# Patient Record
(demographics unavailable — no encounter records)

---

## 2024-12-19 NOTE — PHYSICAL EXAM
[Right] : right shoulder [] : no deformity [FreeTextEntry1] : xray of the R shoulder revealed no evidence of hill sachs lesion  [TWNoteComboBox4] : passive forward flexion 175 degrees

## 2024-12-19 NOTE — DATA REVIEWED
[MRI] : MRI [Right] : of the right [Shoulder] : shoulder [Report was reviewed and noted in the chart] : The report was reviewed and noted in the chart [I independently reviewed and interpreted images and report] : I independently reviewed and interpreted images and report [I reviewed the films/CD] : I reviewed the films/CD [FreeTextEntry1] : 10/02/24:MRI of the R shoulder revealed a  nondisplaced labral tear of the posterior superior labrum with associated para labral cyst

## 2024-12-19 NOTE — DISCUSSION/SUMMARY
[de-identified] : The patient's condition is acute Confounding medical conditions/concerns: N/A Tests/Studies Independently Interpreted Today: MRI of the R shoulder revealed a  nondisplaced labral tear of the posterior superior labrum with associated para labral cyst  xray of the c-spine revealed  no evidence of dyana abnormalities xray of the R shoulder revealed no evidence of hill sachs lesion ------------------------------------------------------------------------------------------------------------------   We reviewed the MRI findings and discussed treatment options, both operative vs non-operative for the patient's shoulder instability and labral tear. Discussed treatment options for considering he has noted multiple dislocation episodes. 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 Advised the patient that there is increased risk of requiring surgery if their shoulder continues to dislocate. Any recurrent dislocation will result in further, possible permanent cartilage damage eventually requiring surgical intervention.   Discussed the risk of progression of OA and mechanical issues with an unfixed labral tear  Both the pt and mother understand and wish to proceed with conservative management during his wrestling season  Also discussed Sherie brace as well   Prescribed the patient Ibuprofen 600mgs, take one tablet three times daily with food. Discussed risks of side effects and timing and management of medication. Side effects include but are not limited to gi ulcers and irritation, as well as kidney failure and bleeding issues.   Discussed activity modifications. Rec load management during his wrestling season  Can follow up during his off season to re-eval and consider sx management    I, Manju Rico, attest that this documentation has been prepared under the direction and in the presence of Provider Dr. Pedro Luis Monteiro

## 2024-12-19 NOTE — DISCUSSION/SUMMARY
[de-identified] : The patient's condition is acute Confounding medical conditions/concerns: N/A Tests/Studies Independently Interpreted Today: MRI of the R shoulder revealed a  nondisplaced labral tear of the posterior superior labrum with associated para labral cyst  xray of the c-spine revealed  no evidence of dyana abnormalities xray of the R shoulder revealed no evidence of hill sachs lesion ------------------------------------------------------------------------------------------------------------------   We reviewed the MRI findings and discussed treatment options, both operative vs non-operative for the patient's shoulder instability and labral tear. Discussed treatment options for considering he has noted multiple dislocation episodes. 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 Advised the patient that there is increased risk of requiring surgery if their shoulder continues to dislocate. Any recurrent dislocation will result in further, possible permanent cartilage damage eventually requiring surgical intervention.   Discussed the risk of progression of OA and mechanical issues with an unfixed labral tear  Both the pt and mother understand and wish to proceed with conservative management during his wrestling season  Also discussed Sherie brace as well   Prescribed the patient Ibuprofen 600mgs, take one tablet three times daily with food. Discussed risks of side effects and timing and management of medication. Side effects include but are not limited to gi ulcers and irritation, as well as kidney failure and bleeding issues.   Discussed activity modifications. Rec load management during his wrestling season  Can follow up during his off season to re-eval and consider sx management    I, Manju Rico, attest that this documentation has been prepared under the direction and in the presence of Provider Dr. Pedro Luis Monteiro

## 2024-12-19 NOTE — PHYSICAL EXAM
no [Right] : right shoulder [] : no deformity [FreeTextEntry1] : xray of the R shoulder revealed no evidence of hill sachs lesion  [TWNoteComboBox4] : passive forward flexion 175 degrees

## 2024-12-19 NOTE — HISTORY OF PRESENT ILLNESS
[de-identified] : Here for consult on the right shoulder that has been having continued complaints with subluxations/disclocations since last year. Had been treating with another MD, obtained a MRI @ [facility] and has been doing PT since last year but has not gotten much relief. Wrestles for [facility]. Referred from  @ [facility] this past weekend

## 2024-12-19 NOTE — HISTORY OF PRESENT ILLNESS
[de-identified] : Here for consult on the right shoulder that has been having continued complaints with subluxations/disclocations since last year. Had been treating with another MD, obtained a MRI @ [facility] and has been doing PT since last year but has not gotten much relief. 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 Referred from  @ [facility] this past weekend

## 2025-01-30 NOTE — HISTORY OF PRESENT ILLNESS
[de-identified] : Follow up on the MRI Results of the right shoulder. Has been wrestling and just some soreness following. Mom says that they were called with the preliminary results.

## 2025-01-30 NOTE — DISCUSSION/SUMMARY
[Medication Risks Reviewed] : Medication risks reviewed [Surgical risks reviewed] : Surgical risks reviewed [de-identified] : The patients condition is acute.  Confounding medical conditions/concerns: None  Tests/Studies Independently Interpreted Today: MRI right shoulder reveals evidence of chronic appearing anterior inferior labral tearing, acromioclavicular joint effusion and sprain.   We reviewed the MRI findings and discussed treatment options, both operative vs non-operative for the patient's shoulder instability and labral tearing given his wrestling activity. Since the patient denies any acute shoulder dislocation, he decides to hold off on surgical intervention and proceed with conservative treatment modalities to include physical therapy, NSAIDs, ice, bracing, etc. Advised the patient that there is increased risk of requiring surgery if his shoulder does dislocate. Any dislocation will result in further, possible permanent cartilage damage eventually requiring surgical intervention. The risks and benefits of surgery have been discussed. Risks include but are not limited to bleeding, infection, reaction to anesthesia, injury to blood vessels and nerves, malunion, nonunion, DVT, PE, necessity of repeat surgery, chronic pain, loss of limb and death. The patient understands the risks and has chosen to proceed with conservative care. All questions have been answered. Start physical therapy   Upon completion of his wrestling season and his pain/function level, he will consider arthroscopy. If this is hindering his activity status, we recommended he proceed with arthroscopy labral repair. We are not ruling out surgery however, will defer.   Given the patients intact strength, he may continue to wrestle as tolerated. He may utilize a dary brace for practicing.   Prescribed the patient Motrin 600mgs and discussed risks of side effects and timing and management of medication. Side effects include but are not limited to gi ulcers and irritation, as well as kidney failure and bleeding issues.  Follow up in 4 weeks

## 2025-01-30 NOTE — DATA REVIEWED
[MRI] : MRI [Right] : of the right [Shoulder] : shoulder [I independently reviewed and interpreted images and report] : I independently reviewed and interpreted images and report [Report was reviewed and noted in the chart] : The report was reviewed and noted in the chart [I reviewed the films/CD] : I reviewed the films/CD [FreeTextEntry1] : MRI right shoulder reveals evidence of chronic appearing anterior inferior labral tearing, acromioclavicular joint effusion and sprain.

## 2025-01-30 NOTE — HISTORY OF PRESENT ILLNESS
[de-identified] : Follow up on the MRI Results of the right shoulder. Has been wrestling and just some soreness following. Mom says that they were called with the preliminary results.

## 2025-01-30 NOTE — DISCUSSION/SUMMARY
[Medication Risks Reviewed] : Medication risks reviewed [Surgical risks reviewed] : Surgical risks reviewed [de-identified] : The patients condition is acute.  Confounding medical conditions/concerns: None  Tests/Studies Independently Interpreted Today: MRI right shoulder reveals evidence of chronic appearing anterior inferior labral tearing, acromioclavicular joint effusion and sprain.   We reviewed the MRI findings and discussed treatment options, both operative vs non-operative for the patient's shoulder instability and labral tearing given his wrestling activity. Since the patient denies any acute shoulder dislocation, he decides to hold off on surgical intervention and proceed with conservative treatment modalities to include physical therapy, NSAIDs, ice, bracing, etc. Advised the patient that there is increased risk of requiring surgery if his shoulder does dislocate. Any dislocation will result in further, possible permanent cartilage damage eventually requiring surgical intervention. The risks and benefits of surgery have been discussed. Risks include but are not limited to bleeding, infection, reaction to anesthesia, injury to blood vessels and nerves, malunion, nonunion, DVT, PE, necessity of repeat surgery, chronic pain, loss of limb and death. The patient understands the risks and has chosen to proceed with conservative care. All questions have been answered. Start physical therapy   Upon completion of his wrestling season and his pain/function level, he will consider arthroscopy. If this is hindering his activity status, we recommended he proceed with arthroscopy labral repair. We are not ruling out surgery however, will defer.   Given the patients intact strength, he may continue to wrestle as tolerated. He may utilize a dary brace for practicing.   Prescribed the patient Motrin 600mgs and discussed risks of side effects and timing and management of medication. Side effects include but are not limited to gi ulcers and irritation, as well as kidney failure and bleeding issues.  Follow up in 4 weeks

## 2025-01-30 NOTE — PHYSICAL EXAM
[Right] : right shoulder [5 ___] : forward flexion 5[unfilled]/5 [5___] : internal rotation 5[unfilled]/5 [] : negative shoulder relocation [TWNoteComboBox4] : passive forward flexion 180 degrees [TWNoteComboBox9] : passive abduction 180 degrees

## 2025-01-30 NOTE — RETURN TO WORK/SCHOOL
[FreeTextEntry1] :    Diagnosis: right shoulder labral tear   X: Patient may return to school with excused lateness on Jan 28, 2025     X: Patient may return to gym/sports Jan 28, 2025     X: Patient may return to full activities Jan 28, 2025     Sincerely, Pedro Luis Monteiro MD Co- Chief Sports Medicine [facility]  [employer]

## 2025-01-30 NOTE — DATA REVIEWED
[MRI] : MRI [Right] : of the right [Shoulder] : shoulder [Report was reviewed and noted in the chart] : The report was reviewed and noted in the chart [I independently reviewed and interpreted images and report] : I independently reviewed and interpreted images and report [I reviewed the films/CD] : I reviewed the films/CD [FreeTextEntry1] : MRI right shoulder reveals evidence of chronic appearing anterior inferior labral tearing, acromioclavicular joint effusion and sprain.

## 2025-01-30 NOTE — RETURN TO WORK/SCHOOL
[FreeTextEntry1] :    Diagnosis: right shoulder labral tear   X: Patient may return to school with excused lateness on Jan 28, 2025     X: Patient may return to gym/sports Jan 28, 2025     X: Patient may return to full activities Jan 28, 2025     Sincerely, Pedro Luis Monteiro MD Co- Chief Sports Medicine [facility] Medical Director [facility]  [employer]